# Patient Record
Sex: FEMALE | Race: WHITE
[De-identification: names, ages, dates, MRNs, and addresses within clinical notes are randomized per-mention and may not be internally consistent; named-entity substitution may affect disease eponyms.]

---

## 2018-04-09 ENCOUNTER — HOSPITAL ENCOUNTER (OUTPATIENT)
Dept: HOSPITAL 47 - RADMAMWWP | Age: 43
Discharge: HOME | End: 2018-04-09
Payer: COMMERCIAL

## 2018-04-09 DIAGNOSIS — Z12.31: Primary | ICD-10-CM

## 2018-04-09 PROCEDURE — 77067 SCR MAMMO BI INCL CAD: CPT

## 2018-04-10 NOTE — MM
Reason for exam: screening  (asymptomatic).

Last mammogram was performed 4 years and 9 months ago.



History:

Took hormonal contraceptives for 10 years.



Physical Findings:

A clinical breast exam by your physician is recommended on an annual basis and 

results should be correlated with mammographic findings.



MG Screening Mammo w CAD

Bilateral CC and MLO view(s) were taken.

Prior study comparison: June 24, 2013, CAD bilateral diagnostic mammogram.  March 20, 2012, bilateral digital screening mammo w/CAD.

The breast tissue is extremely dense which could obscure a lesion on mammography. 

No significant changes when compared with prior studies.





ASSESSMENT: Negative, BI-RAD 1



RECOMMENDATION:

Routine screening mammogram of both breasts in 1 year.

## 2021-08-10 ENCOUNTER — HOSPITAL ENCOUNTER (OUTPATIENT)
Dept: HOSPITAL 47 - RADUSWWP | Age: 46
Discharge: HOME | End: 2021-08-10
Attending: INTERNAL MEDICINE
Payer: COMMERCIAL

## 2021-08-10 DIAGNOSIS — N20.0: Primary | ICD-10-CM

## 2021-08-10 DIAGNOSIS — R10.9: ICD-10-CM

## 2021-08-10 PROCEDURE — 76700 US EXAM ABDOM COMPLETE: CPT

## 2021-08-10 NOTE — US
EXAMINATION TYPE: US abdomen complete

 

DATE OF EXAM: 8/10/2021

 

COMPARISON: NONE

 

CLINICAL HISTORY: R10.9 ABD PAIN. Patient who is very active runner for exercise states she has pinch
ing sensation at umbilicus level.

 

EXAM MEASUREMENTS:

 

Liver Length:  16.8 cm   

Gallbladder Wall:  0.1 cm   

CBD:  0.2 cm

Spleen:  8.4 cm   

Right Kidney:  8.8 x 5.9 x 4.0 cm 

Left Kidney:  10.5 x 5.9 x 4.6 cm   

 

 

 

Pancreas:  wnl

Liver:  wnl  

Gallbladder:  wnl

**Evidence for sonographic Taylor's sign:  no

CBD:  wnl 

Spleen:  wnl   

Right Kidney:  hyperechoic focus with posterior shadowing is seen in lower pole = 0.2 x 0.2 x 0.3cm  
  

Left Kidney:  wnl   

Upper IVC:  wnl  

Abd Aorta:  size is wnl; hyperechoic. linear focus is noted in Right TRISTIN in multiple views at level o
f umbilicus. 

No hernia is seen with or without Valsalva Maneuver.

 

 

IMPRESSION: 

1. No acute process.

2. Nonobstructing right renal calculus.

3. Hyperechoic focus near the wall of the left common iliac artery and represent atherosclerotic plaq
ue. Recommend dedicated CTA or angiogram for further evaluation.

## 2021-08-31 ENCOUNTER — HOSPITAL ENCOUNTER (OUTPATIENT)
Dept: HOSPITAL 47 - RADMAMWWP | Age: 46
Discharge: HOME | End: 2021-08-31
Attending: OBSTETRICS & GYNECOLOGY
Payer: COMMERCIAL

## 2021-08-31 DIAGNOSIS — Z12.31: Primary | ICD-10-CM

## 2021-08-31 PROCEDURE — 77063 BREAST TOMOSYNTHESIS BI: CPT

## 2021-08-31 PROCEDURE — 77067 SCR MAMMO BI INCL CAD: CPT

## 2021-09-01 NOTE — MM
Reason for exam: screening  (asymptomatic).

Last mammogram was performed 3 years and 5 months ago.



History:

Took hormonal contraceptives for 10 years.



Physical Findings:

A clinical breast exam by your physician is recommended on an annual basis and 

results should be correlated with mammographic findings.



MG 3D Screening Mammo W/Cad

Bilateral CC and MLO view(s) were taken.

Prior study comparison: April 9, 2018, bilateral MG screening mammo w CAD.  June 24, 2013, CAD bilateral diagnostic mammogram.

The breast tissue is extremely dense which could obscure a lesion on mammography. 

There is no discrete abnormality.  No significant changes when compared with 

prior studies.





ASSESSMENT: Negative, BI-RAD 1



RECOMMENDATION:

Routine screening mammogram of both breasts in 1 year.